# Patient Record
Sex: FEMALE | Race: WHITE | Employment: FULL TIME | URBAN - METROPOLITAN AREA
[De-identification: names, ages, dates, MRNs, and addresses within clinical notes are randomized per-mention and may not be internally consistent; named-entity substitution may affect disease eponyms.]

---

## 2019-07-09 ENCOUNTER — APPOINTMENT (OUTPATIENT)
Dept: GENERAL RADIOLOGY | Facility: HOSPITAL | Age: 59
End: 2019-07-09
Payer: COMMERCIAL

## 2019-07-09 ENCOUNTER — APPOINTMENT (OUTPATIENT)
Dept: CT IMAGING | Facility: HOSPITAL | Age: 59
End: 2019-07-09
Payer: COMMERCIAL

## 2019-07-09 PROCEDURE — 71045 X-RAY EXAM CHEST 1 VIEW: CPT

## 2019-07-09 PROCEDURE — 70450 CT HEAD/BRAIN W/O DYE: CPT

## 2019-07-09 PROCEDURE — 71275 CT ANGIOGRAPHY CHEST: CPT

## 2019-07-09 NOTE — ED INITIAL ASSESSMENT (HPI)
PT arrived to ER with friend. Friend states PT was drinking since 1730 with cigars and cigarettes. Friend states PT told him \"I am having a heart attack\". PT denies any pain or dyspnea at this time. PT received 3 baby ASA PTA.

## 2019-07-09 NOTE — ED PROVIDER NOTES
Patient Seen in: [facility] Emergency Department    History   Patient presents with:  Chest Pain Angina (cardiovascular)    Stated Complaint:     HPI    41-year-old female in the emergency department with complaint that she had an episode of feeling 116/73   Pulse 80   Resp 16   Ht 167.6 cm (5' 6\")   Wt 61.7 kg   SpO2 97%   Breastfeeding?  No   BMI 21.95 kg/m²         Physical Exam        GENERAL APPEARANCE:     Well developed, well nourished, alert   , with slurred speech    Head: Normocephalic and a Normal   PROTHROMBIN TIME (PT) - Normal   LIPASE - Normal   D-DIMER - Normal    Narrative:     FEU = Fibrinogen Equivalent Units.     D-Dimer results of less than 0.5 ug/mL (FEU) have been shown to contribute to the exclusion of venous thromboembolism with unexplained syncope or near syncope    CT ANGIOGRAM CHEST WITH IV CONTRAST (PULMONARY ANGIOGRAM)      IMPRESSION:    There is good opacification of the pulmonary arteries and no evidence for pulmonary embolism. No thoracic aortic aneurysm or dissection. Prescribed:  Current Discharge Medication List

## (undated) NOTE — ED AVS SNAPSHOT
Ulysses Mart   MRN: [id number]    Department:  [facility] Emergency Department   Date of Visit:  7/9/2019           Disclosure     Insurance plans vary and the physician(s) referred by the ER may not be covered by your plan.  Please contact yo tell this physician (or your personal doctor if your instructions are to return to your personal doctor) about any new or lasting problems. The primary care or specialist physician will see patients referred from the [facility] Emergency Department.  Kimberly Krishnamurthy